# Patient Record
(demographics unavailable — no encounter records)

---

## 2017-07-03 NOTE — ED PHYSICIAN CHART
Chief Complaint/HPI





- Patient Information


Date Seen:: 07/03/17


Time Seen:: 13:20


Chief Complaint:: vomiting


History of Present Illness:: 


THIS IS A 10 YO FEMALE WITH TWO DAYS OFSHE VOMITING AND UNABLE TO EAT WITH A 

SORE THROAT.  SHE DENIES HAVING A COUGH AND HAS A SMALL AMOUNT OF ABDOMINAL 

PAIN.  SHE WAS SEEN AT ANOTHER CLINIC 5 DAYS AGO BUT WAS NOT GIVEN ANY 

MEDICATIONS. SHE DENIES URINARY TRACT SYMPTOMS. THIS PATIENT HAS JRA AND 

CURRENTLY BEING TREATED AT [facility]


Allergies:: 


 Allergies











Allergy/AdvReac Type Severity Reaction Status Date / Time


 


No Known Allergies Allergy   Verified 07/03/17 13:12











Vitals:: 


 Vital Signs - 8 hr











  07/03/17





  13:14


 


Temp 99.0 F


 





 


RR 22


 


/79


 


O2 Sat % 98











Historian:: Patient, Family Member (FATHER)


Review:: Nurse's Note Reviewed





Review of Systems





- Review of Systems


General/Constitutional: Fever, No chills, No weight loss, No weakness, No 

diaphoresis, No edema, No loss of appetite


Skin: No skin lesions, No rash, No bruising


Head: No headache, No light-headedness


Eyes: No loss of vision, No pain, No diplopia


ENT: No earache, No nasal drainage, Sore throat, No tinnitus


Neck: No neck pain, No swelling, No thyromegaly, No stiffness, No mass noted


Cardio Vascular: No chest pain, No palpitations, No PND, No orthopnea, No edema


Pulmonary: No SOB, No cough, No sputum, No wheezing


GI: Nausea, Vomiting, No diarrhea, Pain, No melena, No hematochezia, No 

constipation, No hematemesis


G/U: No dysuria, No frequency, No hematuria


Musculoskeletal: No bone or joint pain, No back pain, No muscle pain


Endocrine: No polyuria, No polydipsia


Psychiatric: No prior psych history, No depression, No anxiety, No suicidal 

ideation


Hematopoietic: No bruising, No lymphadenopathy


Allergic/Immuno: No urticaria, No angioedema


Neurological: No syncope, No focal symptoms, No weakness, No paresthesia, No 

headache, No seizure, No dizziness, No confusion, No vertigo





Past Medical History





- Past Medical History


Obtainable: Yes


Past Medical History: No significant medical hx


Family History: None


Social History: Non Smoker, No Alcohol, No Drug Use


Surgical History: None


Psychiatricy History: None


Medication: Reviewed





Physical Exam





- Physical Examination


General/Constitutional: Awake, Well-developed, well-nourished, Alert, No 

distress, GCS 15, Non-toxic appearing, Ambulatory


Head: Atraumatic


Eyes: Lids, conjuctiva normal, PERRL, EOMI


Skin: Nl inspection, No rash, No skin lesions, No ecchymosis, Well hydrated, No 

lymphadenopathy


ENMT: External ears, nose nl, Nasal exam nl, Lips, teeth, gums nl, Oropharynx 

nl (SLIGHTLY RED AND SWOLLEN)


Neck: Nontender, Full ROM w/o pain, No JVD, No nuchal rigidity, No bruit, No 

mass, No stridor


Respiratory: Nl effort/Exclusion, Clear to Auscultation, No Wheeze/Rhonchi/Rales


Cardio Vascular: RRR, No murmur, gallop, rubs, NL S1 S2


GI: No tenderness/rebounding/guarding, No organomegaly, No hernia, Normal BS's, 

Nondistended, No mass/bruits, No McBurney tenderness


: No CVA tenderness


Extremities: No tenderness or effusion, Full ROM, normal strength in all 

extremities, No edema, Normal digits & nails


Neuro/Psych: Alert/oriented, DTR's symmetric, Normal sensory exam, Normal motor 

strength, Judgement/insight normal, Mood normal, Normal gait, No focal deficits


Misc: normal gait, Normal back, No paraspinal tenderness





Labs/Radiology/EKG Results





- Lab Results


Results: 


 Laboratory Tests











  07/03/17





  13:15


 


WBC  17.3 H


 


RBC  4.97 H


 


Hgb  12.8


 


Hct  39.4


 


MCV  79.2


 


MCH  25.7


 


MCHC Differential  32.5


 


RDW  15.1


 


Plt Count  340


 


MPV  8.3














Assessment





- Assessment


General Assessment: 





THE PATIENT RESPONDED TO THE ANTIBIOTICS AND FLUIDS LOOKING MUCH BETTER NOW.   

SHE WAS CLEARLY DEHYDRATED AND HAD FEVER WITH A SORE THROAT.  





ED Septic Shock





- .


Is Septic Shock (SBP<90, OR Lactate>4 mmol\L) present?: No





- <6hrs of presentation:


Vital Signs: 


 Vital Signs - 8 hr











  07/03/17





  13:14


 


Temp 99.0 F


 





 


RR 22


 


/79


 


O2 Sat % 98














Reassessment (Disposition)





- Reassessment


Reassessment Condition:: Improved





- Diagnosis


Diagnosis:: 





ACTUE PHARYNGITIS


DEHYDRATION 





- Aftercare/Follow up Instructions


Aftercare/Follow-Up Instructions:: Counseled pt regarding lab results/diagnosis 

& need follow up, Refer to Discharge Instructions, Counseled pt & family 

regarding lab results/diagnosis & need follow up





- Patient Disposition


Discharge/Transfer:: Home


Condition at Disposition:: Improved





ED Discharge Plan





- Patient Disposition


Admit/Discharge/Transfer: PT DISCHARGED HOME


Condition at Disposition: Improved